# Patient Record
Sex: FEMALE | Race: BLACK OR AFRICAN AMERICAN
[De-identification: names, ages, dates, MRNs, and addresses within clinical notes are randomized per-mention and may not be internally consistent; named-entity substitution may affect disease eponyms.]

---

## 2018-04-27 ENCOUNTER — HOSPITAL ENCOUNTER (EMERGENCY)
Dept: HOSPITAL 92 - ERS | Age: 38
Discharge: HOME | End: 2018-04-27
Payer: MEDICAID

## 2018-04-27 DIAGNOSIS — G43.909: ICD-10-CM

## 2018-04-27 DIAGNOSIS — J02.0: Primary | ICD-10-CM

## 2018-04-27 DIAGNOSIS — F17.210: ICD-10-CM

## 2018-04-27 PROCEDURE — 87081 CULTURE SCREEN ONLY: CPT

## 2018-04-27 PROCEDURE — 96374 THER/PROPH/DIAG INJ IV PUSH: CPT

## 2018-04-27 PROCEDURE — 87430 STREP A AG IA: CPT

## 2019-12-10 ENCOUNTER — HOSPITAL ENCOUNTER (INPATIENT)
Dept: HOSPITAL 92 - ERS | Age: 39
LOS: 2 days | Discharge: HOME | DRG: 505 | End: 2019-12-12
Attending: ORTHOPAEDIC SURGERY | Admitting: ORTHOPAEDIC SURGERY
Payer: MEDICAID

## 2019-12-10 VITALS — BODY MASS INDEX: 24 KG/M2
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
## 2019-12-10 DIAGNOSIS — S82.65XA: ICD-10-CM
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
## 2019-12-10 DIAGNOSIS — G43.909: ICD-10-CM

## 2019-12-10 DIAGNOSIS — Z28.21: ICD-10-CM

## 2019-12-10 DIAGNOSIS — F17.200: ICD-10-CM

## 2019-12-10 DIAGNOSIS — S92.322A: Primary | ICD-10-CM
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
## 2019-12-10 PROCEDURE — G0390 TRAUMA RESPONS W/HOSP CRITI: HCPCS

## 2019-12-10 PROCEDURE — 96374 THER/PROPH/DIAG INJ IV PUSH: CPT

## 2019-12-10 PROCEDURE — S0020 INJECTION, BUPIVICAINE HYDRO: HCPCS

## 2019-12-10 PROCEDURE — C1713 ANCHOR/SCREW BN/BN,TIS/BN: HCPCS

## 2019-12-10 PROCEDURE — 29515 APPLICATION SHORT LEG SPLINT: CPT

## 2019-12-10 PROCEDURE — 76000 FLUOROSCOPY <1 HR PHYS/QHP: CPT

## 2019-12-10 PROCEDURE — 36415 COLL VENOUS BLD VENIPUNCTURE: CPT

## 2019-12-10 PROCEDURE — 85025 COMPLETE CBC W/AUTO DIFF WBC: CPT

## 2019-12-10 PROCEDURE — 96372 THER/PROPH/DIAG INJ SC/IM: CPT

## 2019-12-10 RX ADMIN — ACETAMINOPHEN AND CODEINE PHOSPHATE PRN TAB: 300; 30 TABLET ORAL at 21:35

## 2019-12-10 NOTE — HP
HISTORY OF PRESENT ILLNESS:  The patient is a 39-year-old lady, who is examined in

the emergency room of [facility] [place] in [place].  She reports that earlier in the

evening, she was running after her child when she sustained a twisting injury to the

left foot and ankle.  She reports hearing a pop and immediate pain.  There was no

loss of consciousness.  Upon arrival at [place], her chief complaint was that of

left foot and ankle pain.  X-rays were obtained, that showed a nondisplaced Cullen A

fracture of the lateral malleolus, but also some mild widening and irregularity at

the tarsometatarsal joint at the second metatarsal region.  A followup CT scan of

this area showed evidence of a bony Lisfranc injury with avulsions from the plantar

base of the second metatarsal and some widening at the Lisfranc joint.  As such, the

patient now admitted with anticipation of surgical intervention tomorrow. 



PAST MEDICAL HISTORY:  Otherwise healthy except for history of migraine headaches.



PAST SURGICAL HISTORY:  Prior  as well as right breast lumpectomy as well

as hernia repair as an infant. 



MEDICATIONS:  None.



ALLERGIES:  NONE.



SOCIAL HISTORY:  She denies alcohol or drug use.  She does smoke cigarettes on a

daily basis. 



FAMILY HISTORY:  Noncontributory.



REVIEW OF SYSTEMS:  No recent fevers, chills, or sweats.  Denies chest pain or

shortness of breath.  Denies numbness or tingling in this left lower extremity. 



PHYSICAL EXAMINATION:

VITAL SIGNS:  Temperature of 98.5 degrees Fahrenheit orally, heart rate of 80,

respiratory rate of 18, and blood pressure 143/94. 

HEENT:  Atraumatic, normocephalic. 

HEART:  Shows regular rate and rhythm without murmur. 

LUNGS:  Clear to auscultation bilaterally with good breath sounds.  Chest wall is

nontender. 

ABDOMEN:  Flat and nontender. 

PELVIS:  Stable. 

EXTREMITIES:  Remarkable for this left lower extremity with an atraumatic hip and

knee.  She is found to have swelling at the lateral aspect of the ankle with pain to

palpation at the tip of the lateral malleolus, but no crepitation.  She is nontender

to palpation along the medial malleolus and deltoid ligament.  Her area of maximal

discomfort is overlying the base  the first and second metatarsals at the

area of the Lisfranc joint.  There is also swelling noted in this area.  She is

nontender to palpation along the lateral aspect of the foot.  Distal sensation is

intact with normal sensation dorsally and along the plantar surface of the foot.

Her foot compartments are soft. 



IMAGING STUDIES:  Plain x-ray, three-view of the foot, is remarkable for some mild

widening of the Lisfranc joint with some bony irregularity consistent with a small

avulsion from the lateral aspect of the medial cuneiform as well as some small

avulsions of the neighboring cuneiform and cuboid.  There is also an insertional

avulsion fracture from the second metatarsal base. 



LABORATORY DATA:  CBC pending.



ASSESSMENT:  A 39-year-old status post twisting injury to left foot and ankle,

sustaining nondisplaced lateral malleolus fracture and evidence of a Lisfranc

injury. 



PLAN:  At this time, the patient will be admitted to the Orthopedic Fracture

Service.  I have discussed with the patient that she does have a significant midfoot

injury, that I do recommend surgical intervention.  We have discussed risks and

benefits regarding surgical intervention.  Risks include, but are not limited to

bleeding, infection, nerve injury, midfoot arthrosis, loss of limb or life.  The

patient appears to understand and does wish to proceed.  Informed consent will be

obtained prior to surgery. 







Job ID:  [id number]

## 2019-12-10 NOTE — RAD
XR Ankle Lt 3 View STANDARD



History: Pain



Comparison: None.



Findings: Nondisplaced distal fibular fracture below the level of the syndesmosis. Moderate lateral m
alleolar edema.



Impression: Nondisplaced distal fibular fracture below the syndesmosis. No lateral talar shift.



Reported By: Randy Rome 

Electronically Signed:  12/10/2019 5:54 PM

## 2019-12-10 NOTE — RAD
XR Foot Lt 3 View STANDARD



History: Pain in foot after fall



Comparison: None.



Findings: Subtle widening of the Lisfranc interval. Concern for a fracture of the lateral margin of t
he cuneiform versus a displaced fracture of the medial aspect of the second metacarpal base.

Remainder of the foot appears unremarkable.



Impression: High concern for Lisfranc fracture. CT recommended if clinically warranted.



Reported By: Randy Rome 

Electronically Signed:  12/10/2019 5:54 PM

## 2019-12-10 NOTE — CT
CT LEFT FOOT WITHOUT CONTRAST:

12/10/19

 

HISTORY: 

Injury. Pain.

 

COMPARISON: 

None.

 

FINDINGS:

Nondisplaced distal fibular fracture at the level of the syndesmosis. No lateral talar shift. No post
erior or medial malleolar fracture. The calcaneus is intact. Talus is intact. 

 

A small avulsion fracture of the AITFL. 

 

There is a fracture of the intermediate cuneiform as well as the second metatarsal base at the expect
ed location of the Lisfranc ligament insertion. There is a very subtle intra-articular fracture of th
e medial cuneiform at the tarsal metatatarsal joint. Subtle fracture of the lateral margin lateral cu
neiform extending into the tarsal metatarsal joint. Also a nondisplaced fracture of the cuboid extend
ing into the fifth tarsal metatarsal joint. Minimal lateral subluxation of the second metatarsal base
. There is also a small plantar fracture of the lateral cuneiform. 

 

IMPRESSION: 

1.      Nondisplaced distal fibular fracture below the level of the syndesmosis and extensive adjacen
t soft tissue swelling.

2.      Small osseous avulsion of the distal fibula at the AITFL. 

3.      Lisfranc ligament insertional avulsion fracture of the second metatarsal base. 

4.      Numerous midfoot fractures as described. 

 

POS: HOME

## 2019-12-11 LAB
BASOPHILS # BLD AUTO: 0.1 THOU/UL (ref 0–0.2)
BASOPHILS NFR BLD AUTO: 1.5 % (ref 0–1)
EOSINOPHIL # BLD AUTO: 0 THOU/UL (ref 0–0.7)
EOSINOPHIL NFR BLD AUTO: 0.8 % (ref 0–10)
HGB BLD-MCNC: 12.3 G/DL (ref 12–16)
LYMPHOCYTES # BLD: 1.4 THOU/UL (ref 1.2–3.4)
LYMPHOCYTES NFR BLD AUTO: 28 % (ref 21–51)
MCH RBC QN AUTO: 34.4 PG (ref 27–31)
MCV RBC AUTO: 102 FL (ref 78–98)
MONOCYTES # BLD AUTO: 0.5 THOU/UL (ref 0.11–0.59)
MONOCYTES NFR BLD AUTO: 9.1 % (ref 0–10)
NEUTROPHILS # BLD AUTO: 3 THOU/UL (ref 1.4–6.5)
NEUTROPHILS NFR BLD AUTO: 60.5 % (ref 42–75)
PLATELET # BLD AUTO: 196 THOU/UL (ref 130–400)
RBC # BLD AUTO: 3.59 MILL/UL (ref 4.2–5.4)
WBC # BLD AUTO: 5 THOU/UL (ref 4.8–10.8)

## 2019-12-11 PROCEDURE — 0QSP04Z REPOSITION LEFT METATARSAL WITH INTERNAL FIXATION DEVICE, OPEN APPROACH: ICD-10-PCS | Performed by: ORTHOPAEDIC SURGERY

## 2019-12-11 PROCEDURE — 0QSHXZZ REPOSITION LEFT TIBIA, EXTERNAL APPROACH: ICD-10-PCS | Performed by: ORTHOPAEDIC SURGERY

## 2019-12-11 RX ADMIN — ONDANSETRON PRN MG: 2 INJECTION INTRAMUSCULAR; INTRAVENOUS at 06:59

## 2019-12-11 RX ADMIN — CEFAZOLIN SODIUM SCH MLS: 2 SOLUTION INTRAVENOUS at 20:03

## 2019-12-11 RX ADMIN — ACETAMINOPHEN AND CODEINE PHOSPHATE PRN TAB: 300; 30 TABLET ORAL at 05:07

## 2019-12-11 NOTE — RAD
XR Foot Lt 3 View STANDARD



History: ORIF



Comparison: CT prior day



Findings: Percutaneous 10 placement through the fourth and fifth metatarsals extending to the lateral
 cuneiform. There is also antegrade screw through the medial cuneiform and second metatarsal base

as well as a retrograde screw through the intermediate cuneiform and the third metatarsal base.



Impression: Satisfactory postoperative alignment.



Reported By: Randy Rome 

Electronically Signed:  12/11/2019 5:06 PM

## 2019-12-12 VITALS — SYSTOLIC BLOOD PRESSURE: 130 MMHG | TEMPERATURE: 98.6 F | DIASTOLIC BLOOD PRESSURE: 81 MMHG

## 2019-12-12 RX ADMIN — ONDANSETRON PRN MG: 2 INJECTION INTRAMUSCULAR; INTRAVENOUS at 07:43

## 2019-12-12 RX ADMIN — CEFAZOLIN SODIUM SCH MLS: 2 SOLUTION INTRAVENOUS at 04:50

## 2019-12-12 NOTE — OP
DATE OF PROCEDURE:  12/11/2019



PREOPERATIVE DIAGNOSIS:  Left foot Lisfranc fracture and left lateral malleolus

fracture. 



POSTOPERATIVE DIAGNOSIS:  Left foot Lisfranc fracture and left lateral malleolus

fracture. 



PROCEDURE PERFORMED:  

1. Open reduction and internal fixation of Lisfranc fracture.

2. Closed treatment of left lateral malleolus fracture.



ANESTHESIA:  General.



ASSISTANT:  Raul Naranjo PA-C.



TOURNIQUET TIME:  Approximately 1 hour at 300 mmHg.



ESTIMATED BLOOD LOSS:  10 mL.



IMPLANTS:  3.5 mm cortical screws x2.



COMPLICATIONS:  None.



DRAINS:  None.



SPECIMEN:  None.



OUTCOME:  Near-anatomic alignment.



INDICATIONS FOR PROCEDURE:  The patient is a 39-year-old lady who was running down

the hallway when she twisted her left foot and ankle sustaining injury.  X-rays

demonstrated what appeared to be widening at the Lisfranc joint and a followup CT

showed evidence of fractures of the cuneiform and fracture at the base of the second

metatarsal consistent with a Lisfranc injury.  The patient also found to have a left

lateral malleolus fracture that is nondisplaced and below the ankle mortise.  Given

the findings on CT scan, the patient now taken to the operating room for

stabilization of this fracture.  Informed consent has been obtained.  All questions

answered. 



DESCRIPTION OF PROCEDURE:  The patient was brought to the operating room and a

time-out performed followed by induction of general anesthesia.  Next, patient was

positioned supine on the OR table and a sterile prep and drape was performed in the

left lower extremity.  The limb was then exsanguinated with Esmarch bandage,

tourniquet inflated to 300 mmHg.  Next, under C-arm localization, two incisions were

made dorsally, one between the first and second metatarsal bases and extending up to

the level of the junction between the middle and medial cuneiform.  A second

incision made between the third and fourth metatarsal bases.  Blunt dissection was

carried down until the interval between the lateral aspect of the medial cuneiform

and the base of the second metatarsal could be visualized.  This was clearly open

and there was evidence of tear of the Lisfranc ligament.  Under direct

visualization, this was reduced and held in place with a tenaculum.  Next, a third

small incision was made at the medial aspect of the medial cuneiform.  After skin

was sharply incised, dissection was carried down bluntly to the bone.  Next, a drill

was passed obliquely across the medial cuneiform across the Lisfranc joint capturing

the base of the second metatarsal.  An initial attempt was made to also capture the

third metatarsal; however, this proved to be not providing acceptable stabilization

and as such, the drill was backed out to just include the second metatarsal.

Measurement was taken and a screw was passed across the medial cuneiform across the

Lisfranc joint capturing both cortices of the proximal second metatarsal closing up

the Lisfranc joint to an anatomic alignment.  Next, through the second incision,

blunt dissection was carried down to the lateral aspect of the third metatarsal.  A

drill was then passed from both cortices of the third metatarsal heading medially

towards the middle cuneiform.  Once appropriately positioned, a screw was passed to

stabilize this ray.  This was then followed by passage of a K-wire across the fourth

metatarsal base and into the lateral cuneiform stabilizing this lateral ray.  At

this point, there was felt to be restoration of the normal anatomy.  There was not

felt to be any instability between the great toe metatarsal and medial cuneiform.

This was stressed under fluoroscopic guidance and there was found to be no abnormal

movement.  There was also found to be no abnormal motion between the middle and

medial cuneiform.  As such, after the two screws and pin were placed, the incisions

were irrigated with bulb syringe, then closed with nylon, Xeroform, gauze, and 

a well-padded fiberglass splint was then applied to the ankle.  The lateral

malleolus was incorporated with this splint for initiation of closed treatment of

this fracture.  The tourniquet was let down.  The patient was transferred to

recovery room in stable condition.  There were no complications.  She tolerated the

procedure well. 







Job ID:  [id number]

## 2020-07-15 ENCOUNTER — HOSPITAL ENCOUNTER (EMERGENCY)
Dept: HOSPITAL 92 - ERS | Age: 40
Discharge: HOME | End: 2020-07-15
Payer: COMMERCIAL

## 2020-07-15 DIAGNOSIS — G43.909: ICD-10-CM

## 2020-07-15 DIAGNOSIS — K64.4: Primary | ICD-10-CM

## 2020-07-15 DIAGNOSIS — F17.210: ICD-10-CM

## 2020-07-15 PROCEDURE — 99283 EMERGENCY DEPT VISIT LOW MDM: CPT
